# Patient Record
Sex: FEMALE | Race: BLACK OR AFRICAN AMERICAN | URBAN - METROPOLITAN AREA
[De-identification: names, ages, dates, MRNs, and addresses within clinical notes are randomized per-mention and may not be internally consistent; named-entity substitution may affect disease eponyms.]

---

## 2019-04-25 ENCOUNTER — HISTORICAL (OUTPATIENT)
Dept: RADIOLOGY | Facility: HOSPITAL | Age: 44
End: 2019-04-25

## 2019-05-01 LAB
ABS NEUT (OLG): 7.58 X10(3)/MCL (ref 2.1–9.2)
BUN SERPL-MCNC: 13 MG/DL (ref 7–18)
CALCIUM SERPL-MCNC: 9 MG/DL (ref 8.5–10.1)
CHLORIDE SERPL-SCNC: 106 MMOL/L (ref 98–107)
CO2 SERPL-SCNC: 27 MMOL/L (ref 21–32)
CREAT SERPL-MCNC: 1.08 MG/DL (ref 0.55–1.02)
CREAT/UREA NIT SERPL: 12
ERYTHROCYTE [DISTWIDTH] IN BLOOD BY AUTOMATED COUNT: 13.3 % (ref 11.5–17)
GLUCOSE SERPL-MCNC: 96 MG/DL (ref 74–106)
HCT VFR BLD AUTO: 42 % (ref 37–47)
HGB BLD-MCNC: 13.9 GM/DL (ref 12–16)
MCH RBC QN AUTO: 30.2 PG (ref 27–31)
MCHC RBC AUTO-ENTMCNC: 33.1 GM/DL (ref 33–36)
MCV RBC AUTO: 91.3 FL (ref 80–94)
PLATELET # BLD AUTO: 289 X10(3)/MCL (ref 130–400)
PMV BLD AUTO: 10 FL (ref 7.4–10.4)
POTASSIUM SERPL-SCNC: 3.6 MMOL/L (ref 3.5–5.1)
RBC # BLD AUTO: 4.6 X10(6)/MCL (ref 4.2–5.4)
SODIUM SERPL-SCNC: 139 MMOL/L (ref 136–145)
WBC # SPEC AUTO: 11.3 X10(3)/MCL (ref 4.5–11.5)

## 2019-05-29 ENCOUNTER — HISTORICAL (OUTPATIENT)
Dept: SURGERY | Facility: HOSPITAL | Age: 44
End: 2019-05-29

## 2019-11-22 ENCOUNTER — HISTORICAL (OUTPATIENT)
Dept: SURGERY | Facility: HOSPITAL | Age: 44
End: 2019-11-22

## 2019-12-23 ENCOUNTER — HISTORICAL (OUTPATIENT)
Dept: SURGERY | Facility: HOSPITAL | Age: 44
End: 2019-12-23

## 2022-04-11 ENCOUNTER — HISTORICAL (OUTPATIENT)
Dept: ADMINISTRATIVE | Facility: HOSPITAL | Age: 47
End: 2022-04-11

## 2022-04-28 VITALS
DIASTOLIC BLOOD PRESSURE: 80 MMHG | SYSTOLIC BLOOD PRESSURE: 117 MMHG | WEIGHT: 226 LBS | HEIGHT: 67 IN | BODY MASS INDEX: 35.47 KG/M2

## 2022-04-30 NOTE — OP NOTE
Patient:   Liliam Booth            MRN: [id number]            FIN: [id number]               Age:   43 years     Sex:  Female     :  1975   Associated Diagnoses:   None   Author:   Deric Lei MD      Operative Note   Operative Information   Date/ Time:  2019 09:32:00.     Procedures Performed:   1.  Right knee diagnostic arthroscopy  2.  Lateral tibial chondroplasty  3.  Anterior medialization of the tibial tubercle  4.  Anterior compartment fasciotomy (prophylactic)  5.  Open patella cartilage allograft procedure with bio cartilage (15 mm x 12 mm isolated lesion).     Preoperative Diagnosis:   1.  Right knee patella maltracking  2.  Patella cartilage isolated defect  .     Postoperative Diagnosis:   1.  Right knee patella maltracking  2.  Patella cartilage isolated defect  3.  Lateral tibial chondromalacia grade 3.     Surgeon: Deric Lei MD.     Anesthesia:   General anesthesia with a local anesthetic (60 cc of 0.25% Marcaine with epi).     Esimated blood loss: loss less than  70  cc.     Complications: None.     Notes:   DVT prophylaxis: Patient placed on aspirin for 2 weeks  Instrumentation: Arthrex 4.0 cannulated screws x2, Arthrex bio cartilage.       Diagnostic knee arthroscopy    The patient was carefully place in a supine position. The operative lower extremity was placed in the padded leg lin. The non-operative lower extremity was placed in padded leg rest. The operative site was prepped and draped in the normal sterile fashion. A time out was performed to confirm correct operative extremity, allergies, and antibiotic infusion.   The knee joint was infused with 60cc of Marcaine used to insufflate. The supero-medial inflow portal is established. The yoselin-medial and yoselin-lateral portals are established. All portal sites established with 11-blade.    Through the yoselin-lateral portal, we then sequentially visualized these areas of the knee for any pathology: medial  comparment, postero-medial compartment, lateral compartment, postero-lateral compartment, intercondylar compartment, suprapatellar compartment, medial gutter, and lateral gutter.     The certified assistant provided critical assistance by holding instruments including the arthroscope to provide adequate visualization of the procedure, as well as assisting in wound closure.    At the end of the procedure the knee portal sites were closed with 3-0 Monocryl subcutaneously.  The patient tolerated the procedure well and taken to the recovery room in good condition.     Lateral tibial chondroplasty  Type 3 chondromalcia was noted on the Lateral tibial surface. A motorized shaver was introduced through the yoselin-medial portal. The area of chondromalacia was debrided of all loose, soft, frayed and fibrillated cartilage material. A well contoured, smooth base of firm cartilage was produced.      Tibial Tubercle Anteromedialization     I then started with a long longitudinal incision over the midportion of the patella that extended down to the distal end of the tibial tubercle.  I then defined the medial and lateral borders of the patella tendon as it attached to the tibial tubercle.  I also did a open lateral release of the retinaculum.  I then took down the tibialis anterior from the lateral aspect of the proximal tibia.  I did this with a Bovie and careful blunt dissection with a Hendrix.  I then placed a retractor behind the posterior lateral aspect of the proximal tibia to protect the neurovascular structures.  I then placed the Arthrex cutting guide just over the medial aspect of the tibial tubercle.  After pinning the 45° guide in place and in the correct position, I then used a sagittal saw to cut the tibial tubercle.  I then used a TPS saw to finish up the proximal and distal ends of the cut.  I then used osteotomes to fully separate the tibial tubercle osteotomy.  I then moved the tibial tubercle anteriorly and  medially along the osteotomy cut for a length of 10 mm.  I then placed, from anterior to posterior, 2 guidewires.  I placed these guidewires using fluoroscopy.  I then measured the length of the guidewires.  I drilled over the guidewires and then placed the appropriate sized screw.  Again all this was done carefully using fluoroscopy in order to not penetrate posterior to the tibial cortex and injure any neurovascular structures.  The tourniquet was then let down and the dorsalis pedis and posterior tibialis vessels were palpable.  Also there was no hematoma or fluid collection noted in the popliteal fossa.    Open patella osteochondral allograft    A medial arthrotomy was made and the full-thickness cartilage defect was observed on the patella on the inferior surface of the lateral facet.  This lesion measured 15 x 12 mm. The defect was debrided with a curette to make a stable base. Next the defect with drill with 2.0 K-wire. Then the defect was then filled with the allograft material and then a fibrin sealant was placed over the allograft.      Anterior compartment fasciotomy (prophylactic)    At the inferior part of the incision, I used an [employer] to elevate the subcutaneous tissue.  I could visualize the fascia overlying the tibialis anterior muscle.  I then took curved Mayos and released under the fascia to make sure I had good clearance with the scissors.  I then used the curved Mayos and released the anterior compartment. .

## 2022-05-05 NOTE — HISTORICAL [facility] CERNER
This is a historical note converted from [employer]. Formatting and pictures may have been removed.  Please reference [employer] for original formatting and attached multimedia. OPERATIVE REPORT  ?  DATE: 11/22/2019  ?  ASSISTANT: Aleshia Rowe  ?  PREOPERATIVE DIAGNOSIS:  1. ?Right knee?failed?patella?allograft cartilage?procedure  ?  POSTOPERATIVE DIAGNOSIS:  1. ?Right knee?failed?patella?allograft cartilage?procedure (15 mm x 15 mm?central?cartilage defect)  ?  PROCEDURES:  1. ?Right knee diagnostic arthroscopy  2.? Patella chondroplasty  3. ?Cartilage harvest?from lateral femoral notch  ?  ANESTHESIA:  General anesthesia  ?  BLOOD LOSS:  None  ?  DVT PROPHYLAXIS:  She was placed on aspirin for 2 weeks  ?  INSTRUMENTATION:  None  ?  PROCEDURE IN DETAIL:  ?  Diagnostic knee arthroscopy  ?  The patient was carefully place in a supine position. The operative lower extremity was placed in the padded leg lin. The non-operative lower extremity was placed in padded leg rest. The operative site was prepped and draped in the normal sterile fashion. A time out was performed to confirm correct operative extremity, allergies, and antibiotic infusion.?  The knee joint was infused with 60cc of Marcaine used to insufflate. The supero-medial inflow portal is established. The yoselin-medial and yoselin-lateral portals are established. All portal sites established with 11-blade.  ?  Through the yoselin-lateral portal, we then sequentially visualized these areas of the knee for any pathology: medial comparment, postero-medial compartment, lateral compartment, postero-lateral compartment, intercondylar compartment, suprapatellar compartment, medial gutter, and lateral gutter.?  ?  The certified assistant provided critical assistance by holding instruments including the arthroscope to provide adequate visualization of the procedure, as well as assisting in wound closure.  ?  At the end of the procedure the knee portal sites were closed with  3-0 Monocryl subcutaneously. ?The patient tolerated the procedure well and taken to the recovery room in good condition.?  ?  Patellar chondroplasty  ?  I noticed intraoperatively that this patient had?an isolated?full-thickness?unipolar?grade 4?cartilage defect?measured 15 mm x 15 mm. ?It was surrounded by?normal hyaline cartilage.?A motorized shaver was introduced through the yoselin-lateral portal. A well contoured, smooth base of firm cartilage was produced.??  ?  Cartilage harvest  ?  An arthroscopic curette was used to harvest on the lateral intercondylar notch and also the far lateral condyle ridge.

## 2022-05-05 NOTE — HISTORICAL [facility] CERNER
This is a historical note converted from [employer]. Formatting and pictures may have been removed.  Please reference [employer] for original formatting and attached multimedia. OPERATIVE REPORT  ?  DATE: 12/23/2019  ?  ASSISTANT: CORINA Lloyd  ?  PREOPERATIVE DIAGNOSIS:  1. ?Right patella?cartilage defect  ?  POSTOPERATIVE DIAGNOSIS:  1. ?Right patella cartilage defect (central and inferior?measuring?15 mm x 15 mm)  ?  PROCEDURES:  1.? Matrix autologous chondrocyte implantation?of the patella  ?  ANESTHESIA:  General anesthesia with femoral nerve block  ?  BLOOD LOSS:  Less than?40cc  ?  DVT PROPHYLAXIS:  This patient placed on DVT prophylaxis for 2 weeks  ?  INSTRUMENTATION:  Vericel?chondrocyte matrix  ?  PROCEDURE IN DETAIL:  ?  Matrix autologous chondrocyte implantation of patella  ?  Patient brought to the room and placed on the table in the supine position.? The operative lower extremity was prepped and draped in normal sterile fashion.? A timeout procedure was performed to confirm the patient, allergies, the procedure, the surgical site, and if antibiotics were given.  ?  The procedure started with a midline incision over the knee.? Soft tissues were developed medially and laterally.? An arthrotomy was done just next to the patella.? Careful dissection was done to not destroy any native cartilage.??A?K wire?was placed?through the patella from medial to lateral?and was used as a joystick to?expose the patella. The patella?defect was easily visualized.? Next a 15 blade was used to demario out the soft and fibrillated cartilage.? Then a curette was carefully used to remove the cartilage and calcified cartilage layer.? There was stable borders of cartilage around the entire defect.? I then placed the cartilage matrix with the cell side up on the cutting device.? I placed a eschmark under the device to help with cutting the cartilage matrix.? I then placed the cutting guide that correlated to the defect over the  cartilage matrix and appropriately cut out the appropriate size to fill the defect.? I obtain good hemostasis and make sure we did not have any bleeding within the defect. Next I placed the fibrin glue into the defect.? Then the cartilage matrix was placed into the defect of the cell side down.? Light thumb pressure was applied to the matrix until the fibrin glue to dry.? After 3 minutes of waiting for fibrin glue to dry, I then moved the knee through full range of motion and there was no disruption of the cartilage matrix.? I then irrigated out the wound very thoroughly but did not irrigate the cartilage defect.? The arthrotomy was closed with #1 Vicryl.? The subcutaneous skin was closed with 2-0 Vicryl.? The skin was closed with running 3-0 Monocryl?and Exofin.?A sterile dressing was placed and the patient was placed in a postoperative knee brace.?Patient tolerated procedure well without any intraoperative complications.

## 2024-10-08 ENCOUNTER — OFFICE VISIT (OUTPATIENT)
Dept: ORTHOPEDICS | Facility: CLINIC | Age: 49
End: 2024-10-08
Payer: COMMERCIAL

## 2024-10-08 ENCOUNTER — HOSPITAL ENCOUNTER (OUTPATIENT)
Dept: RADIOLOGY | Facility: CLINIC | Age: 49
Discharge: HOME | End: 2024-10-08
Attending: ORTHOPAEDIC SURGERY
Payer: COMMERCIAL

## 2024-10-08 VITALS
DIASTOLIC BLOOD PRESSURE: 82 MMHG | WEIGHT: 226 LBS | HEIGHT: 67 IN | SYSTOLIC BLOOD PRESSURE: 130 MMHG | BODY MASS INDEX: 35.47 KG/M2 | HEART RATE: 56 BPM

## 2024-10-08 DIAGNOSIS — R52 PAIN MANAGEMENT: ICD-10-CM

## 2024-10-08 DIAGNOSIS — Z98.890 HISTORY OF RIGHT KNEE SURGERY: Primary | ICD-10-CM

## 2024-10-08 DIAGNOSIS — M25.561 RIGHT KNEE PAIN, UNSPECIFIED CHRONICITY: ICD-10-CM

## 2024-10-08 PROCEDURE — 73564 X-RAY EXAM KNEE 4 OR MORE: CPT | Mod: RT,,, | Performed by: ORTHOPAEDIC SURGERY

## 2024-10-08 PROCEDURE — 3008F BODY MASS INDEX DOCD: CPT | Mod: CPTII,,, | Performed by: ORTHOPAEDIC SURGERY

## 2024-10-08 PROCEDURE — 99213 OFFICE O/P EST LOW 20 MIN: CPT | Mod: ,,, | Performed by: ORTHOPAEDIC SURGERY

## 2024-10-08 PROCEDURE — 3079F DIAST BP 80-89 MM HG: CPT | Mod: CPTII,,, | Performed by: ORTHOPAEDIC SURGERY

## 2024-10-08 PROCEDURE — 1160F RVW MEDS BY RX/DR IN RCRD: CPT | Mod: CPTII,,, | Performed by: ORTHOPAEDIC SURGERY

## 2024-10-08 PROCEDURE — 1159F MED LIST DOCD IN RCRD: CPT | Mod: CPTII,,, | Performed by: ORTHOPAEDIC SURGERY

## 2024-10-08 PROCEDURE — 3075F SYST BP GE 130 - 139MM HG: CPT | Mod: CPTII,,, | Performed by: ORTHOPAEDIC SURGERY

## 2024-10-08 RX ORDER — GABAPENTIN 300 MG/1
300 CAPSULE ORAL 2 TIMES DAILY
COMMUNITY

## 2024-10-08 RX ORDER — ZOLPIDEM TARTRATE 10 MG/1
10 TABLET ORAL NIGHTLY
COMMUNITY

## 2024-10-08 RX ORDER — BUDESONIDE AND FORMOTEROL FUMARATE DIHYDRATE 80; 4.5 UG/1; UG/1
AEROSOL RESPIRATORY (INHALATION)
COMMUNITY

## 2024-10-08 RX ORDER — POTASSIUM CHLORIDE 750 MG/1
10 TABLET, EXTENDED RELEASE ORAL
COMMUNITY

## 2024-10-08 RX ORDER — VALSARTAN 80 MG/1
TABLET ORAL
COMMUNITY

## 2024-10-08 RX ORDER — MONTELUKAST SODIUM 10 MG/1
10 TABLET ORAL NIGHTLY
COMMUNITY

## 2024-10-08 RX ORDER — PAROXETINE HYDROCHLORIDE 20 MG/1
20 TABLET, FILM COATED ORAL
COMMUNITY

## 2024-10-08 RX ORDER — HYDROCHLOROTHIAZIDE 25 MG/1
25 TABLET ORAL EVERY MORNING
COMMUNITY

## 2024-10-08 RX ORDER — OXYCODONE HYDROCHLORIDE 10 MG/1
10 TABLET ORAL
COMMUNITY
Start: 2024-10-04

## 2024-10-08 RX ORDER — METHYLPREDNISOLONE 4 MG/1
TABLET ORAL
COMMUNITY

## 2024-10-08 RX ORDER — ATORVASTATIN CALCIUM 40 MG/1
TABLET, FILM COATED ORAL
COMMUNITY
Start: 2023-11-15

## 2024-10-08 RX ORDER — HYDROXYZINE PAMOATE 50 MG/1
50 CAPSULE ORAL
COMMUNITY

## 2024-10-08 RX ORDER — TRAZODONE HYDROCHLORIDE 100 MG/1
100 TABLET ORAL NIGHTLY PRN
COMMUNITY

## 2024-10-08 RX ORDER — HYDROCODONE BITARTRATE AND ACETAMINOPHEN 10; 325 MG/1; MG/1
TABLET ORAL
COMMUNITY

## 2024-10-08 RX ORDER — LINACLOTIDE 145 UG/1
1 CAPSULE, GELATIN COATED ORAL DAILY
COMMUNITY

## 2024-10-08 RX ORDER — BACLOFEN 10 MG/1
10 TABLET ORAL 3 TIMES DAILY
COMMUNITY

## 2024-10-08 NOTE — PROGRESS NOTES
[facility]  [facility]thopedic Clinic Note      Chief Complaint:   Chief Complaint   Patient presents with    Right Knee - Pain     Right knee pain, Prior  sx by [facility] 2019- knee cap was out of place and did some repairs, had fallen a month ago- pain in the knee and hip- hip is feeling better but the knee has swelling and pain that is constant      Referring Physician: No ref. provider found      History of Present Illness:    This is a 49 y.o. year old female presenting with complaints of right knee pain after a fall approximately 1 month ago.  She does have a prior history of a right knee TTO performed in 2019.  She states that she hurt both her knee and hip during the fall and the hip has improved but the knee continues to bother her.  She has had some swelling and pain deep in the knee.  It has improved somewhat since the fall.  She was followed by a pain management provider.  She did go to the ED yesterday for some right shoulder pain and received a Toradol intramuscular injection.      Past Medical History:   Diagnosis Date    Hyperlipemia        Past Surgical History:   Procedure Laterality Date    CHOLECYSTECTOMY      HYSTERECTOMY      TUBAL LIGATION         Current Outpatient Medications   Medication Sig    atorvastatin (LIPITOR) 40 MG tablet 1 TABLET ORALLY ONCE A DAY 90 DAYS    baclofen (LIORESAL) 10 MG tablet Take 10 mg by mouth 3 (three) times daily.    budesonide-formoterol 80-4.5 mcg (SYMBICORT) 80-4.5 mcg/actuation HFAA SMARTSIG:By Mouth    gabapentin (NEURONTIN) 300 MG capsule Take 300 mg by mouth 2 (two) times daily.    hydroCHLOROthiazide (HYDRODIURIL) 25 MG tablet Take 25 mg by mouth every morning.    HYDROcodone-acetaminophen (NORCO)  mg per tablet 1 BY MOUTH FOUR TIMES DAILY AS NEEDED FOR PAIN.    hydrOXYzine pamoate (VISTARIL) 50 MG Cap Take 50 mg by mouth.    LINZESS 145 mcg Cap capsule Take 1 capsule by mouth once daily.    methylPREDNISolone (MEDROL DOSEPACK) 4 mg tablet Take by  "mouth.    montelukast (SINGULAIR) 10 mg tablet Take 10 mg by mouth every evening.    oxyCODONE (ROXICODONE) 10 mg Tab immediate release tablet Take 10 mg by mouth.    paroxetine (PAXIL) 20 MG tablet Take 20 mg by mouth.    potassium chloride (KLOR-CON) 10 MEQ TbSR Take 10 mEq by mouth.    traZODone (DESYREL) 100 MG tablet Take 100 mg by mouth nightly as needed.    valsartan (DIOVAN) 80 MG tablet TAKE ONE TABLET BY MOUTH EVERY DAY FOR BLOOD PRESSURE for 30    zolpidem (AMBIEN) 10 mg Tab Take 10 mg by mouth every evening.     No current facility-administered medications for this visit.       Review of patient's allergies indicates:   Allergen Reactions    Sulfa (sulfonamide antibiotics) Blisters, Rash and Shortness Of Breath    Penicillins Itching    Sulfamethoxazole-trimethoprim Rash       Family History   Problem Relation Name Age of Onset    Arthritis Mother Umberto Horton     Depression Mother Umberto Horton     Hypertension Mother Umberto Horton     COPD Father BRANDEN     Hypertension Father BRANDEN     Cancer Maternal Grandmother DRAWSAND     Diabetes Maternal Grandmother DRAWSAND        Social History     Socioeconomic History    Marital status:    Tobacco Use    Smoking status: Every Day     Current packs/day: 0.25     Average packs/day: 0.3 packs/day for 15.0 years (3.8 ttl pk-yrs)     Types: Cigarettes    Smokeless tobacco: Never   Substance and Sexual Activity    Alcohol use: Never    Drug use: Never    Sexual activity: Yes           Review of Systems:  All review of systems negative except for those stated in the HPI.    Examination:    Vital Signs:    Vitals:    10/08/24 1417   BP: 130/82   Pulse: (!) 56   Weight: 102.5 kg (225 lb 15.5 oz)   Height: 5' 6.54" (1.69 m)   PainSc:   7   PainLoc: Knee       Body mass index is 35.88 kg/m².    Physical Examination:  General: Well-developed, well-nourished.  Neuro: Alert and oriented x 3.  Psych: Normal mood and affect.  Card: Regular rate and rhythm  Resp: Respirations " regular and unlabored  Right Knee Exam:  No obvious deformity. Range of motion from 0-130 degrees. Negative patella grind and equal subluxation of knee cap medial and lateral < 1cm. Negative patella tendon tenderness. Negative Lachman and anterior drawer test. Negative posterior drawer test. Negative varus and valgus stress test. Negative medial joint line tenderness. Negative lateral joint line tenderness. 5/5 strength and normal skin appearance. Sensibility normal.      Imaging: X-rays ordered and images interpreted today personally by me of four views of the right knee demonstrate expected postoperative changes after tibial tubercle osteotomy with no evidence hardware loosening or failure.    Assessment: History of right knee surgery  -     X-Ray Knee Complete 4 Or More Views Right; Future; Expected date: 10/08/2024    Pain management        Plan:  X-rays were reviewed with the patient.  Provided her with reassurance.  We discussed possible right knee corticosteroid injection.  She would like to defer for now.  She will continue her previously prescribed medications as needed for pain.  Encouraged ice application, rest, elevation as needed for swelling.  Over-the-counter medications as needed.  She will return to clinic as needed for any additional issues or concerns.  She verbalized understanding of the plan of care with no further questions.    Deric Lei MD personally performed the services described in this documentation, including but not limited to patient's history, physical examination, and assessment and plan of care. All medical record entries made by MOHIT Rodas were performed at his direction and in his presence. The medical record was reviewed and is accurate and complete.         Follow up if symptoms worsen or fail to improve.      DISCLAIMER: This note may have been dictated using voice recognition software and may contain grammatical errors.     NOTE: Consult report sent to referring  provider via [facility] EMR.

## 2025-05-21 ENCOUNTER — TELEPHONE (OUTPATIENT)
Dept: SURGERY | Facility: CLINIC | Age: 50
End: 2025-05-21
Payer: COMMERCIAL